# Patient Record
Sex: MALE | Race: WHITE
[De-identification: names, ages, dates, MRNs, and addresses within clinical notes are randomized per-mention and may not be internally consistent; named-entity substitution may affect disease eponyms.]

---

## 2019-12-20 ENCOUNTER — HOSPITAL ENCOUNTER (OUTPATIENT)
Dept: HOSPITAL 95 - LAB | Age: 59
Discharge: HOME | End: 2019-12-20
Attending: NURSE PRACTITIONER
Payer: COMMERCIAL

## 2019-12-20 DIAGNOSIS — Z12.11: Primary | ICD-10-CM

## 2019-12-20 PROCEDURE — G0328 FECAL BLOOD SCRN IMMUNOASSAY: HCPCS

## 2020-08-21 ENCOUNTER — HOSPITAL ENCOUNTER (OUTPATIENT)
Dept: HOSPITAL 95 - CT | Age: 60
Discharge: HOME | End: 2020-08-21
Attending: INTERNAL MEDICINE
Payer: COMMERCIAL

## 2020-08-21 DIAGNOSIS — C34.31: Primary | ICD-10-CM

## 2020-08-21 DIAGNOSIS — J44.9: ICD-10-CM

## 2020-08-21 DIAGNOSIS — E78.5: ICD-10-CM

## 2020-08-21 DIAGNOSIS — Z79.899: ICD-10-CM

## 2020-08-21 DIAGNOSIS — F17.290: ICD-10-CM

## 2020-08-21 DIAGNOSIS — F17.210: ICD-10-CM

## 2021-01-08 ENCOUNTER — HOSPITAL ENCOUNTER (OUTPATIENT)
Dept: HOSPITAL 95 - MHTC | Age: 61
Discharge: HOME | End: 2021-01-08
Attending: INTERNAL MEDICINE
Payer: COMMERCIAL

## 2021-01-08 VITALS — WEIGHT: 218.26 LBS | HEIGHT: 68 IN | BODY MASS INDEX: 33.08 KG/M2

## 2021-01-08 DIAGNOSIS — I71.4: ICD-10-CM

## 2021-01-08 DIAGNOSIS — Z88.8: ICD-10-CM

## 2021-01-08 DIAGNOSIS — Z79.82: ICD-10-CM

## 2021-01-08 DIAGNOSIS — F17.210: ICD-10-CM

## 2021-01-08 DIAGNOSIS — I25.118: Primary | ICD-10-CM

## 2021-01-08 DIAGNOSIS — I50.30: ICD-10-CM

## 2021-01-08 DIAGNOSIS — Z95.2: ICD-10-CM

## 2021-01-08 DIAGNOSIS — Z79.899: ICD-10-CM

## 2021-01-08 DIAGNOSIS — I11.0: ICD-10-CM

## 2021-01-08 DIAGNOSIS — J44.9: ICD-10-CM

## 2021-01-08 DIAGNOSIS — E78.5: ICD-10-CM

## 2021-01-08 PROCEDURE — C1894 INTRO/SHEATH, NON-LASER: HCPCS

## 2021-01-08 PROCEDURE — B2111ZZ FLUOROSCOPY OF MULTIPLE CORONARY ARTERIES USING LOW OSMOLAR CONTRAST: ICD-10-PCS | Performed by: INTERNAL MEDICINE

## 2021-01-08 PROCEDURE — C1769 GUIDE WIRE: HCPCS

## 2021-01-08 NOTE — NUR
OFFICE CALLED FOR FOLLOW-UP APPOINTMENT. WILL CONTACT PATIENT AS HE IS HAVING
SURGERY NEXT WEEK IN [place].

## 2021-01-08 NOTE — NUR
PT RETURNED TO RECOVERY ROOM IN BED. RIGHT RADIAL TR BAND SITE SOFT NON-TENDER
WITH NO HEMATOMA, NO PULSATILE BLEEDING AND WRIST BOARD IN PLACE. PT DENIES
CHEST PAIN. CALL LIGHT IN REACH. PT DRINKING PEPSI.

## 2022-10-01 ENCOUNTER — HOSPITAL ENCOUNTER (OUTPATIENT)
Dept: HOSPITAL 95 - LAB | Age: 62
Discharge: HOME | End: 2022-10-01
Attending: NURSE PRACTITIONER
Payer: COMMERCIAL

## 2022-10-01 DIAGNOSIS — Z12.12: ICD-10-CM

## 2022-10-01 DIAGNOSIS — Z12.11: Primary | ICD-10-CM

## 2022-10-01 PROCEDURE — G0328 FECAL BLOOD SCRN IMMUNOASSAY: HCPCS

## 2022-10-20 ENCOUNTER — HOSPITAL ENCOUNTER (OUTPATIENT)
Dept: HOSPITAL 95 - LAB SHORT | Age: 62
End: 2022-10-20
Attending: NURSE PRACTITIONER
Payer: COMMERCIAL

## 2022-10-20 DIAGNOSIS — E11.9: Primary | ICD-10-CM
